# Patient Record
Sex: MALE | Race: WHITE | Employment: UNEMPLOYED | RURAL
[De-identification: names, ages, dates, MRNs, and addresses within clinical notes are randomized per-mention and may not be internally consistent; named-entity substitution may affect disease eponyms.]

---

## 2020-09-09 ENCOUNTER — HOSPITAL ENCOUNTER (EMERGENCY)
Facility: HOSPITAL | Age: 55
Discharge: ANOTHER HOSPITAL | End: 2020-09-10
Attending: EMERGENCY MEDICINE
Payer: COMMERCIAL

## 2020-09-09 ENCOUNTER — APPOINTMENT (OUTPATIENT)
Dept: CT IMAGING | Facility: HOSPITAL | Age: 55
End: 2020-09-09
Payer: COMMERCIAL

## 2020-09-09 LAB
A/G RATIO: 1.3 (ref 0.8–2)
ALBUMIN SERPL-MCNC: 3.6 G/DL (ref 3.4–4.8)
ALP BLD-CCNC: 376 U/L (ref 25–100)
ALT SERPL-CCNC: 3128 U/L (ref 4–36)
AMMONIA: 79 MCG/DL (ref 27–102)
AMORPHOUS: ABNORMAL /HPF
AMPHETAMINE SCREEN, URINE: NORMAL
AMYLASE: 91 U/L (ref 20–104)
ANION GAP SERPL CALCULATED.3IONS-SCNC: 11 MMOL/L (ref 3–16)
APTT: 30.5 SEC (ref 21.6–33.4)
AST SERPL-CCNC: 2714 U/L (ref 8–33)
BARBITURATE SCREEN URINE: NORMAL
BASOPHILS ABSOLUTE: 0.1 K/UL (ref 0–0.1)
BASOPHILS RELATIVE PERCENT: 1.1 %
BENZODIAZEPINE SCREEN, URINE: NORMAL
BILIRUB SERPL-MCNC: 17.8 MG/DL (ref 0.3–1.2)
BILIRUBIN URINE: ABNORMAL
BLOOD, URINE: NEGATIVE
BUN BLDV-MCNC: 15 MG/DL (ref 6–20)
CALCIUM SERPL-MCNC: 9.1 MG/DL (ref 8.5–10.5)
CANNABINOID SCREEN URINE: NORMAL
CHLORIDE BLD-SCNC: 98 MMOL/L (ref 98–107)
CLARITY: CLEAR
CO2: 30 MMOL/L (ref 20–30)
COCAINE METABOLITE SCREEN URINE: NORMAL
COLOR: ABNORMAL
CREAT SERPL-MCNC: 1.2 MG/DL (ref 0.4–1.2)
CRYSTALS, UA: ABNORMAL /HPF
EOSINOPHILS ABSOLUTE: 0.2 K/UL (ref 0–0.4)
EOSINOPHILS RELATIVE PERCENT: 4.4 %
EPITHELIAL CELLS, UA: ABNORMAL /HPF (ref 0–5)
FINE CASTS, UA: ABNORMAL /LPF (ref 0–2)
GFR AFRICAN AMERICAN: >59
GFR NON-AFRICAN AMERICAN: >59
GLOBULIN: 2.8 G/DL
GLUCOSE BLD-MCNC: 92 MG/DL (ref 74–106)
GLUCOSE URINE: NEGATIVE MG/DL
HCT VFR BLD CALC: 43.7 % (ref 40–54)
HEMOGLOBIN: 14.5 G/DL (ref 13–18)
IMMATURE GRANULOCYTES #: 0 K/UL
IMMATURE GRANULOCYTES %: 0.2 % (ref 0–5)
INR BLD: 1.07 (ref 0.87–1.14)
KETONES, URINE: ABNORMAL MG/DL
LACTIC ACID: 1.8 MMOL/L (ref 0.4–2)
LEUKOCYTE ESTERASE, URINE: NEGATIVE
LIPASE: 91 U/L (ref 5.6–51.3)
LYMPHOCYTES ABSOLUTE: 1.5 K/UL (ref 1.5–4)
LYMPHOCYTES RELATIVE PERCENT: 26.7 %
Lab: NORMAL
MCH RBC QN AUTO: 28.9 PG (ref 27–32)
MCHC RBC AUTO-ENTMCNC: 33.2 G/DL (ref 31–35)
MCV RBC AUTO: 87.1 FL (ref 80–100)
METHADONE SCREEN, URINE: NORMAL
METHAMPHETAMINE, URINE: NORMAL
MICROSCOPIC EXAMINATION: YES
MONOCYTES ABSOLUTE: 0.8 K/UL (ref 0.2–0.8)
MONOCYTES RELATIVE PERCENT: 14.4 %
MUCUS: ABNORMAL /LPF
NEUTROPHILS ABSOLUTE: 2.9 K/UL (ref 2–7.5)
NEUTROPHILS RELATIVE PERCENT: 53.2 %
NITRITE, URINE: NEGATIVE
OPIATE SCREEN URINE: NORMAL
PDW BLD-RTO: 17.2 % (ref 11–16)
PH UA: 7 (ref 5–8)
PHENCYCLIDINE SCREEN URINE: NORMAL
PLATELET # BLD: 264 K/UL (ref 150–400)
PMV BLD AUTO: 10.6 FL (ref 6–10)
POTASSIUM SERPL-SCNC: 4 MMOL/L (ref 3.4–5.1)
PROPOXYPHENE SCREEN, URINE: NORMAL
PROTEIN UA: ABNORMAL MG/DL
PROTHROMBIN TIME: 13.8 SEC (ref 11.7–14.6)
RBC # BLD: 5.02 M/UL (ref 4.5–6)
RBC UA: ABNORMAL /HPF (ref 0–4)
SODIUM BLD-SCNC: 139 MMOL/L (ref 136–145)
SPECIFIC GRAVITY UA: 1.02 (ref 1–1.03)
TOTAL PROTEIN: 6.4 G/DL (ref 6.4–8.3)
TRICYCLIC, URINE: NORMAL
UR OXYCODONE RAPID SCREEN: NORMAL
URINE REFLEX TO CULTURE: ABNORMAL
URINE TYPE: ABNORMAL
UROBILINOGEN, URINE: >=8 E.U./DL
WBC # BLD: 5.5 K/UL (ref 4–11)
WBC UA: ABNORMAL /HPF (ref 0–5)

## 2020-09-09 PROCEDURE — 85730 THROMBOPLASTIN TIME PARTIAL: CPT

## 2020-09-09 PROCEDURE — 81001 URINALYSIS AUTO W/SCOPE: CPT

## 2020-09-09 PROCEDURE — 83605 ASSAY OF LACTIC ACID: CPT

## 2020-09-09 PROCEDURE — 6360000002 HC RX W HCPCS

## 2020-09-09 PROCEDURE — 82140 ASSAY OF AMMONIA: CPT

## 2020-09-09 PROCEDURE — 87040 BLOOD CULTURE FOR BACTERIA: CPT

## 2020-09-09 PROCEDURE — 96375 TX/PRO/DX INJ NEW DRUG ADDON: CPT

## 2020-09-09 PROCEDURE — 82248 BILIRUBIN DIRECT: CPT

## 2020-09-09 PROCEDURE — 96366 THER/PROPH/DIAG IV INF ADDON: CPT

## 2020-09-09 PROCEDURE — 2580000003 HC RX 258: Performed by: EMERGENCY MEDICINE

## 2020-09-09 PROCEDURE — 85610 PROTHROMBIN TIME: CPT

## 2020-09-09 PROCEDURE — 82150 ASSAY OF AMYLASE: CPT

## 2020-09-09 PROCEDURE — 85025 COMPLETE CBC W/AUTO DIFF WBC: CPT

## 2020-09-09 PROCEDURE — 99285 EMERGENCY DEPT VISIT HI MDM: CPT

## 2020-09-09 PROCEDURE — 96361 HYDRATE IV INFUSION ADD-ON: CPT

## 2020-09-09 PROCEDURE — 80053 COMPREHEN METABOLIC PANEL: CPT

## 2020-09-09 PROCEDURE — 36415 COLL VENOUS BLD VENIPUNCTURE: CPT

## 2020-09-09 PROCEDURE — 83690 ASSAY OF LIPASE: CPT

## 2020-09-09 PROCEDURE — 96365 THER/PROPH/DIAG IV INF INIT: CPT

## 2020-09-09 PROCEDURE — 99283 EMERGENCY DEPT VISIT LOW MDM: CPT

## 2020-09-09 PROCEDURE — C9113 INJ PANTOPRAZOLE SODIUM, VIA: HCPCS

## 2020-09-09 PROCEDURE — 6360000002 HC RX W HCPCS: Performed by: EMERGENCY MEDICINE

## 2020-09-09 PROCEDURE — 80307 DRUG TEST PRSMV CHEM ANLYZR: CPT

## 2020-09-09 PROCEDURE — 86757 RICKETTSIA ANTIBODY: CPT

## 2020-09-09 PROCEDURE — 74176 CT ABD & PELVIS W/O CONTRAST: CPT

## 2020-09-09 RX ORDER — 0.9 % SODIUM CHLORIDE 0.9 %
1000 INTRAVENOUS SOLUTION INTRAVENOUS ONCE
Status: COMPLETED | OUTPATIENT
Start: 2020-09-09 | End: 2020-09-10

## 2020-09-09 RX ORDER — ONDANSETRON 2 MG/ML
4 INJECTION INTRAMUSCULAR; INTRAVENOUS ONCE
Status: COMPLETED | OUTPATIENT
Start: 2020-09-09 | End: 2020-09-09

## 2020-09-09 RX ORDER — ONDANSETRON 2 MG/ML
4 INJECTION INTRAMUSCULAR; INTRAVENOUS ONCE
Status: DISCONTINUED | OUTPATIENT
Start: 2020-09-09 | End: 2020-09-10 | Stop reason: HOSPADM

## 2020-09-09 RX ORDER — PANTOPRAZOLE SODIUM 40 MG/10ML
INJECTION, POWDER, LYOPHILIZED, FOR SOLUTION INTRAVENOUS
Status: COMPLETED
Start: 2020-09-09 | End: 2020-09-09

## 2020-09-09 RX ADMIN — SODIUM CHLORIDE 1000 ML: 9 INJECTION, SOLUTION INTRAVENOUS at 23:17

## 2020-09-09 RX ADMIN — ONDANSETRON 4 MG: 2 INJECTION INTRAMUSCULAR; INTRAVENOUS at 22:01

## 2020-09-09 RX ADMIN — PANTOPRAZOLE SODIUM 40 MG: 40 INJECTION, POWDER, FOR SOLUTION INTRAVENOUS at 23:18

## 2020-09-09 ASSESSMENT — PATIENT HEALTH QUESTIONNAIRE - PHQ9: SUM OF ALL RESPONSES TO PHQ QUESTIONS 1-9: 6

## 2020-09-09 ASSESSMENT — PAIN SCALES - GENERAL: PAINLEVEL_OUTOF10: 9

## 2020-09-10 VITALS
SYSTOLIC BLOOD PRESSURE: 102 MMHG | OXYGEN SATURATION: 98 % | RESPIRATION RATE: 18 BRPM | HEIGHT: 70 IN | BODY MASS INDEX: 22.19 KG/M2 | HEART RATE: 59 BPM | DIASTOLIC BLOOD PRESSURE: 67 MMHG | TEMPERATURE: 97.7 F | WEIGHT: 155 LBS

## 2020-09-10 LAB
BILIRUBIN DIRECT: >10 MG/DL (ref 0–0.2)
OCCULT BLOOD DIAGNOSTIC: NORMAL

## 2020-09-10 PROCEDURE — 87341 HEP B SURFACE AG NEUTRLZJ IA: CPT

## 2020-09-10 PROCEDURE — C9113 INJ PANTOPRAZOLE SODIUM, VIA: HCPCS | Performed by: EMERGENCY MEDICINE

## 2020-09-10 PROCEDURE — 80074 ACUTE HEPATITIS PANEL: CPT

## 2020-09-10 PROCEDURE — 6360000002 HC RX W HCPCS: Performed by: EMERGENCY MEDICINE

## 2020-09-10 PROCEDURE — 93005 ELECTROCARDIOGRAM TRACING: CPT

## 2020-09-10 PROCEDURE — 36415 COLL VENOUS BLD VENIPUNCTURE: CPT

## 2020-09-10 PROCEDURE — 87040 BLOOD CULTURE FOR BACTERIA: CPT

## 2020-09-10 PROCEDURE — 2580000003 HC RX 258: Performed by: EMERGENCY MEDICINE

## 2020-09-10 PROCEDURE — G0328 FECAL BLOOD SCRN IMMUNOASSAY: HCPCS

## 2020-09-10 RX ORDER — PANTOPRAZOLE SODIUM 40 MG/10ML
INJECTION, POWDER, LYOPHILIZED, FOR SOLUTION INTRAVENOUS
Status: DISPENSED
Start: 2020-09-10 | End: 2020-09-10

## 2020-09-10 RX ORDER — LORAZEPAM 2 MG/ML
1 INJECTION INTRAMUSCULAR ONCE
Status: COMPLETED | OUTPATIENT
Start: 2020-09-10 | End: 2020-09-10

## 2020-09-10 RX ORDER — 0.9 % SODIUM CHLORIDE 0.9 %
1000 INTRAVENOUS SOLUTION INTRAVENOUS ONCE
Status: COMPLETED | OUTPATIENT
Start: 2020-09-10 | End: 2020-09-10

## 2020-09-10 RX ADMIN — SODIUM CHLORIDE 8 MG/HR: 9 INJECTION, SOLUTION INTRAVENOUS at 00:59

## 2020-09-10 RX ADMIN — LORAZEPAM 1 MG: 2 INJECTION INTRAMUSCULAR; INTRAVENOUS at 02:22

## 2020-09-10 RX ADMIN — SODIUM CHLORIDE 8 MG/HR: 9 INJECTION, SOLUTION INTRAVENOUS at 08:40

## 2020-09-10 RX ADMIN — SODIUM CHLORIDE 1000 ML: 9 INJECTION, SOLUTION INTRAVENOUS at 15:10

## 2020-09-10 NOTE — ED NOTES
OF THE [facility] called. ED requesting to check bed availability at [facility] and [facility]. ED unable to transfer with EMS to THE [facility] per Elizabeth Paula [facility]. [facility] had been called by MERLINE Gomez and they also declined the transfer.      Dorothy Bowman, MATILDA  09/10/20 6857 [facility], MATILDA  09/10/20 1025

## 2020-09-10 NOTE — ED NOTES
Lab called and notified that MD ordered a hepatitis panel to be added on.      Vijay Chaidez RN  09/10/20 9807

## 2020-09-10 NOTE — ED NOTES
Patient en route to [facility]. Patient voicing no needs or concerns at this time.      Mary Jauregui RN  09/10/20 9550

## 2020-09-10 NOTE — ED NOTES
Per Dr. Carlotta Nur request, called [facility] and asked them to contact this patient for acceptance.  Spoke with Latesha Amin, she will send out page and return our call MERCY Ruiz  09/10/20 5057

## 2020-09-10 NOTE — ED NOTES
Access center called with dr Bob leung from [place] [id number] regional    Call transferred to dr Terrie Wilkerson, RN  09/10/20 0824

## 2020-09-10 NOTE — ED TRIAGE NOTES
Pt. 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 Ago and had multiple bites and thinks it was ?deer ticks and turkey mites.

## 2020-09-10 NOTE — ED NOTES
On phone with [facility],will call  and try to get pt. A bed.      aRquel Barboza RN  09/10/20 5978

## 2020-09-10 NOTE — ED NOTES
Attempted to call report to [facility]. Nurse unavailable at this time and will call back asap.      Valerie Tompkins RN  09/10/20 2170

## 2020-09-10 NOTE — ED NOTES
Calls into [facility] and [facility].       Erik Garcia, MATILDA  09/10/20 6208

## 2020-09-10 NOTE — ED PROVIDER NOTES
50-year-old male with a history of IV heroin abuse presents to the emergency department with complaint of yellow-tinged skin, pruritus, nausea, vomiting, right upper quadrant abdominal pain. Also reporting melanotic stool. States this is all been going on the past 4 to 5 days. Denies fever, chest pain or shortness of breath. States he used IV heroin regularly around 25 years ago. 3 months ago he states he used a clean needle once. Also reports that he has been working on remodeling an old camper that has a bunch of needles from previous users. Denies history of hepatitis, liver problems, alcohol abuse. Denies history of abdominal surgeries. Was seen by the previous provider Dr. Argentina Sanz and it was difficult to find local beds. He did discuss with [facility] who did accept the patient. After further discussion with ambulance crews there is no one able to transport the patient at this time. Physical exam  Constitutional no acute distress, heart: Regular rate rhythm, no murmur, respiratory: Clear breath sounds bilaterally, abdomen: Mild tenderness to palpation in the right upper quadrant, no rebound, guarding or rigidity, eyes: Scleral icterus noted, integument: Jaundiced skin    MDM:  50-year-old male presents the emergency department with complaint of right upper quadrant abdominal pain and jaundice skin. Lab work shows findings concerning for acute hepatitis. Total bilirubin 17.8. Had dark stool on exam but negative hemoccult test. Was given protonix by previous provider. After discussing further with ambulance companies and unable to obtain transport for the patient will discuss with more local institutions to see if a bed has opened up. We will also order hepatitis panel as well as hepatic function panel. Discussed with [facility] as well as [facility]. They do not have any beds and are holding in the emergency department.   They are not able to accept the patient. Eventually discussed with Dr. Leyda Glynn from gastroenterology from [facility]. States the patient should be transferred over for further evaluation but will need to be admitted to the hospitalist.  Will discuss with the hospitalist.    Discussed with hospitalist at [facility] who will not accept patient as they do not take care of hepatitis patients there. We are unable to get her hepatitis panel back for the next 24 hours. We discussed with gastroenterology from [facility] Dr. Mateo Nair who is okay with patient coming to the hospital under observation with the hospitalist group. Discussed with Dr. Trina Berman who accepts patient.  Will prepare for transfer,    Diagnosis  Direct hyperbilirubinemia  Jaundice  Nausea and vomiting, non-intractable  Right upper quadrant abdominal pain         Lavetta Mohs, DO  09/10/20 1408

## 2020-09-10 NOTE — ED NOTES
Spoke with  staff who advised that pt. Has been accepted to GI at [facility]. She advised that they will be working on getting him a bed and advised that they may not be able to have a bed tonight.      Soy Rea RN  09/10/20 6975

## 2020-09-10 NOTE — ED NOTES
Dr Ike Reeves states that protonix drip can be discontinued. Patient resting quietly with eyes closed at this time.      Mima Garcia RN  09/10/20 9263

## 2020-09-10 NOTE — ED NOTES
Patient report from Ren Mahmood, [address] [place] [address].      Isidro Herrera RN  09/10/20 8629

## 2020-09-10 NOTE — ED PROVIDER NOTES
[facility]      Pt Name: Tressa Silverio  MRN: [id number]  YOB: 1965  Date of evaluation: 9/9/2020  Provider: Carlene Dunham MD    CHIEF COMPLAINT       Chief Complaint   Patient presents with    Emesis     C/o n/v,denies diarrhea. HISTORY OF PRESENT ILLNESS  (Location/Symptom, Timing/Onset, Context/Setting, Quality, Duration, Modifying Factors, Severity.)   Tressa Silverio is a 48 y.o. male who presents to the emergency department with multiple complaints. Patient states that for approximately 7 days he has had intermittent epigastric and right upper quadrant abdominal pain associated with nausea and vomiting. Patient denies any diarrhea. Patient states that over the last 7 days is gotten progressively more jaundiced associated with pruritus. Patient states that he used to use IV heroin approximately 25 years ago but then had stopped and that the last time that he had used was approximately 3 months ago. Patient denies any abnormal use of Tylenol, Motrin or any nonsteroidals. Patient states that he very seldomly drinks alcohol. Patient states that he has had concerns of increased weakness along with dehydration secondary to decreased oral intake. Patient states that the symptoms were getting progressively worse and he got concerned so EMS was called to bring him to the hospital for further evaluation. Patient denies any chest pain shortness of breath or any focal signs or symptoms. Patient is resting comfortably in the room no acute distress conversing in full sentences      Nursing notes were reviewed.     REVIEW OFSYSTEMS    (2-9 systems for level 4, 10 or more for level 5)   ROS:  General: Increased weakness and fatigue  Cardiovascular:  No chest pain, no palpitations  Respiratory:  No shortness of breath, no cough, no wheezing  Gastrointestinal: Epigastric and right upper quadrant abdominal pain associated with nausea and vomiting and melanotic stools  Musculoskeletal:  No muscle pain, no joint pain  Skin: Jaundice  Neurologic:  No speech problems, no headache, no extremity weakness  Psychiatric:  No anxiety  Genitourinary:  No dysuria, no hematuria    Except as noted above the remainder of the review of systems was reviewed and negative. PAST MEDICAL HISTORY   No past medical history on file. SURGICAL HISTORY       Past Surgical History:   Procedure Laterality Date    ANKLE SURGERY      ELBOW SURGERY           CURRENT MEDICATIONS       Previous Medications    No medications on file       ALLERGIES     Morphine    FAMILY HISTORY     No family history on file.        SOCIAL HISTORY       Social History     Socioeconomic History    Marital status: Single     Spouse name: Not on file    Number of children: Not on file    Years of education: Not on file    Highest education level: Not on file   Occupational History    Not on file   Social Needs    Financial resource strain: Not on file    Food insecurity     Worry: Not on file     Inability: Not on file    Transportation needs     Medical: Not on file     Non-medical: Not on file   Tobacco Use    Smoking status: Not on file   Substance and Sexual Activity    Alcohol use: Not on file    Drug use: Not on file    Sexual activity: Not on file   Lifestyle    Physical activity     Days per week: Not on file     Minutes per session: Not on file    Stress: Not on file   Relationships    Social connections     Talks on phone: Not on file     Gets together: Not on file     Attends Confucianist service: Not on file     Active member of club or organization: Not on file     Attends meetings of clubs or organizations: Not on file     Relationship status: Not on file    Intimate partner violence     Fear of current or ex partner: Not on file     Emotionally abused: Not on file     Physically abused: Not on file     Forced sexual activity: Not on file   Other Topics Concern    Not on file   Social History Narrative    Not on file         PHYSICAL EXAM    (up to 7 for level 4, 8 or more for level 5)     ED Triage Vitals [09/09/20 2120]   BP Temp Temp Source Pulse Resp SpO2 Height Weight   104/76 98.6 °F (37 °C) Oral 72 16 98 % 5' 10\" (1.778 m) 155 lb (70.3 kg)       Physical Exam  General :Patient is awake, alert, oriented, in no acute distress, nontoxic appearing  HEENT: Pupils are equally round and reactive to light. Sclerae is icteric. Mild dry oral mucosa. Uvula is midline  Neck: Neck is supple, full range of motion, trachea midline  Cardiac: Heart regular rate, rhythm, no murmurs, rubs, or gallops  Lungs: Lungs are clear to auscultation, there is no wheezing, rhonchi, or rales. There is no use of accessory muscles. Chest wall: There is no tenderness to palpation over the chest wall or over ribs  Abdomen: Abdomen is soft. Epigastric and right upper quadrant abdominal pain on palpation that re-creates the patient's symptoms. Positive bowel sounds all 4 quadrants  Musculoskeletal: 5 out of 5 strength in all 4 extremities. No focal muscle deficits are appreciated  Neuro: Motor intact, sensory intact, level of consciousness is normal, cerebellar function is normal, reflexes are grossly normal. No evidence of incontinence or loss of bowel or bladder function, no saddle anesthesia noted   Dermatology: Jaundiced skin  Psych: Mentation is grossly normal, cognition is grossly normal. Affect is appropriate.       DIAGNOSTIC RESULTS     EKG: All EKG's are interpreted by the Emergency Department Physician who either signs or Co-signs this chart in the [address] a cardiologist.    The EKG interpreted by me shows normal sinus rhythm rate of 71 per EDMD    RADIOLOGY:   Non-plain film images such as CT, Ultrasound and MRI are read by the radiologist. Plain radiographic images are visualized and preliminarily interpreted by the emergency physician with the below findings:      ? Radiologist's Report Reviewed:  CT ABDOMEN PELVIS WO CONTRAST Additional Contrast? None   Final Result      There is evidence of mild nonspecific periportal edema. Nondistended gallbladder. Nonobstructive right-sided nephrolithiasis. No other acute findings in the abdomen or pelvis.             ED BEDSIDE ULTRASOUND:   Performed by ED Physician - none    LABS:    I have reviewed and interpreted all of the currently available lab results from this visit (ifapplicable):  Results for orders placed or performed during the hospital encounter of 09/09/20   CBC Auto Differential   Result Value Ref Range    WBC 5.5 4.0 - 11.0 K/uL    RBC 5.02 4.50 - 6.00 M/uL    Hemoglobin 14.5 13.0 - 18.0 g/dL    Hematocrit 43.7 40.0 - 54.0 %    MCV 87.1 80.0 - 100.0 fL    MCH 28.9 27.0 - 32.0 pg    MCHC 33.2 31.0 - 35.0 g/dL    RDW 17.2 (H) 11.0 - 16.0 %    Platelets 440 415 - 421 K/uL    MPV 10.6 (H) 6.0 - 10.0 fL    Neutrophils % 53.2 %    Immature Granulocytes % 0.2 0.0 - 5.0 %    Lymphocytes % 26.7 %    Monocytes % 14.4 %    Eosinophils % 4.4 %    Basophils % 1.1 %    Neutrophils Absolute 2.9 2.0 - 7.5 K/uL    Immature Granulocytes # 0.0 K/uL    Lymphocytes Absolute 1.5 1.5 - 4.0 K/uL    Monocytes Absolute 0.8 0.2 - 0.8 K/uL    Eosinophils Absolute 0.2 0.0 - 0.4 K/uL    Basophils Absolute 0.1 0.0 - 0.1 K/uL   Comprehensive Metabolic Panel   Result Value Ref Range    Sodium 139 136 - 145 mmol/L    Potassium 4.0 3.4 - 5.1 mmol/L    Chloride 98 98 - 107 mmol/L    CO2 30 20 - 30 mmol/L    Anion Gap 11 3 - 16    Glucose 92 74 - 106 mg/dL    BUN 15 6 - 20 mg/dL    CREATININE 1.2 0.4 - 1.2 mg/dL    GFR Non-African American >59 >59    GFR African American >59 >59    Calcium 9.1 8.5 - 10.5 mg/dL    Total Protein 6.4 6.4 - 8.3 g/dL    Alb 3.6 3.4 - 4.8 g/dL    Albumin/Globulin Ratio 1.3 0.8 - 2.0    Total Bilirubin 17.8 (HH) 0.3 - 1.2 mg/dL    Alkaline Phosphatase 376 (H) 25 - 100 U/L    ALT 3,128 (H) 4 - 36 U/L    AST 2,714 (H) 8 - 33 U/L Globulin 2.8 g/dL   Urine Reflex to Culture    Specimen: Urine, clean catch   Result Value Ref Range    Color, UA Patience Straw/Yellow    Clarity, UA Clear Clear    Glucose, Ur Negative Negative mg/dL    Bilirubin Urine LARGE (A) Negative    Ketones, Urine TRACE (A) Negative mg/dL    Specific Gravity, UA 1.020 1.005 - 1.030    Blood, Urine Negative Negative    pH, UA 7.0 5.0 - 8.0    Protein, UA TRACE (A) Negative mg/dL    Urobilinogen, Urine >=8.0 (A) <2.0 E.U./dL    Nitrite, Urine Negative Negative    Leukocyte Esterase, Urine Negative Negative    Microscopic Examination YES     Urine Type Voided     Urine Reflex to Culture Not Indicated    Drug Screen, Multiple, Urine   Result Value Ref Range    PCP Screen, Urine Neg Negative <25 ng/mL    Benzodiazepine Screen, Urine Neg Negative <300 ng/mL    Cocaine Metabolite Screen, Urine Neg Negative <300 ng/mL    Amphetamine Screen, Urine Neg Negative <1000 ng/mL    Cannabinoid Scrn, Ur Neg Negative <50 ng/mL    Opiate Scrn, Ur Neg Negative <300 ng/mL    Barbiturate Screen, Ur Neg Negative <680 ng/mL    Tricyclic Neg Negative <587 ng/mL    Methadone Screen, Urine Neg Negative <300 ng/ml    Propoxyphene Screen, Urine Neg Negative <300 ng/mL    Methamphetamine, Urine Neg Negative <1000 ng/mL    UR Oxycodone Rapid Screen Neg Negative <100 ng/mL    Drug Screen Comment: see below    Amylase   Result Value Ref Range    Amylase 91 20 - 104 U/L   Lipase   Result Value Ref Range    Lipase 91.0 (H) 5.6 - 51.3 U/L   Protime-INR   Result Value Ref Range    Protime 13.8 11.7 - 14.6 sec    INR 1.07 0.87 - 1.14   APTT   Result Value Ref Range    aPTT 30.5 21.6 - 33.4 sec   Lactic Acid, Plasma   Result Value Ref Range    Lactic Acid 1.8 0.4 - 2.0 mmol/L   Ammonia   Result Value Ref Range    Ammonia 79 27 - 102 mcg/dL   Microscopic Urinalysis   Result Value Ref Range    Fine Casts, UA 0-2 0 - 2 /LPF    Mucus, UA 1+ (A) None Seen /LPF    WBC, UA 0-2 0 - 5 /HPF    RBC, UA None seen 0 - 4 /HPF Epithelial Cells, UA 0-1 0 - 5 /HPF    Amorphous, UA 1+ /HPF    Crystals, UA Few Ca. Oxalate (A) None Seen /HPF   Blood Occult Stool Diagnostic   Result Value Ref Range    Occult Blood Diagnostic Result: Negative  Normal range: Negative           All other labs were within normal range or not returned as of this dictation. EMERGENCY DEPARTMENT COURSE and DIFFERENTIAL DIAGNOSIS/MDM:   Vitals:    Vitals:    09/09/20 2120   BP: 104/76   Pulse: 72   Resp: 16   Temp: 98.6 °F (37 °C)   TempSrc: Oral   SpO2: 98%   Weight: 155 lb (70.3 kg)   Height: 5' 10\" (1.778 m)       MEDICATIONS ADMINISTERED IN ED:  Medications   pantoprazole (PROTONIX) 40 mg in sodium chloride 0.9 % 50 mL bolus (40 mg Intravenous Not Given 9/10/20 0015)   ondansetron (ZOFRAN) injection 4 mg (4 mg Intravenous Not Given 9/10/20 0016)   pantoprazole (PROTONIX) 80 mg in sodium chloride 0.9 % 100 mL infusion (8 mg/hr Intravenous New Bag 9/10/20 0059)   pantoprazole (PROTONIX) 40 MG injection (has no administration in time range)   ondansetron (ZOFRAN) injection 4 mg (4 mg Intravenous Given 9/9/20 2201)   0.9 % sodium chloride bolus (1,000 mLs Intravenous New Bag 9/9/20 2317)   pantoprazole (PROTONIX) 40 MG injection (40 mg  Given 9/9/20 2318)   LORazepam (ATIVAN) injection 1 mg (1 mg Intravenous Given 9/10/20 0222)       Patient was placed examination room at which time after exam was performed IV was obtained and labs are drawn. Patient was given 1 L IV normal saline bolus along with 80 mg of Protonix IV then placed on Protonix drip of 8 mg IV an hour and 4 mg of Zofran IV. Hemoccult was obtained which was melanotic but guaiac negative. Review of patient's labs, BMP was within normal limits. Alk phos was 376, ALT was 3128. Ammonia was 79. AST was 2714. Total bilirubin was 19.8 and patient's lipase was 91. Patient's UDS was negative. CBC was within normal limits. Urinalysis was within normal limits.   CT scan abdomen pelvis without contrast revealed evidence of mild nonspecific periportal edema. Nondistended gallbladder. Right renal stone but no other acute findings. Results were reviewed with patient and the need to be admitted. Patient requested [facility] which was contacted who stated they were on divert and could not accept the patient. Transfer line was contacted who instructed that both [address] and [address] had no beds. [facility]'s house officer was contacted who also stated they had no beds but shortly after Dr. Viktoria Sheriff at [address] was able to be read secondary to a telemetry bed opening up at [address].  Dr. Viktoria Sheriff stated that patient would need a higher level of care secondary to GI and hepatology. Transfer line was contacted and case was discussed at the [facility] with the hospitalist on call Dr. Celestine Gibson who graciously stated that he would accept the patient but wanted to discuss it with GI who he thought would accept the patient. Dr. Celestine Gibson discussed it with Dr. Kevin Garcia with GI at the [facility] who graciously accepted the patient in transfer for further evaluation definitive care. Patient was appreciative the care agrees with the plan above             CONSULTS:  None    PROCEDURES:  Procedures    CRITICAL CARE TIME    Total Critical Care time was 0 minutes, excluding separately reportable procedures. There was a high probability of clinically significant/life threatening deterioration in the patient's condition which required my urgent intervention. FINAL IMPRESSION      1. Non-intractable vomiting with nausea, unspecified vomiting type Stable   2. Jaundice, non- Stable   3. Viral hepatitis without hepatic coma, unspecified chronicity, unspecified viral hepatitis type Stable   4. Epigastric pain Stable   5. General weakness Stable   6.  Melanotic stools Stable         DISPOSITION/PLAN   DISPOSITION  09/10/2020 04:28:07 AM transfer to the [facility]      PATIENT REFERRED TO:  No follow-up provider specified. DISCHARGE MEDICATIONS:  New Prescriptions    No medications on file       Comment: Please note this report has been produced using speech recognition software and may contain errorsrelated to that system including errors in grammar, punctuation, and spelling, as well as words and phrases that may be inappropriate. If there are any questions or concerns please feel free to contact the dictating providerfor clarification.     Chris Khan MD  Attending Emergency Physician              Chris Khan MD  09/10/20 9306

## 2020-09-11 LAB
HAV IGM SER IA-ACNC: ABNORMAL
HEPATITIS B CORE IGM ANTIBODY: REACTIVE
HEPATITIS B SURFACE ANTIGEN INTERPRETATION: REACTIVE
HEPATITIS C ANTIBODY INTERPRETATION: ABNORMAL

## 2020-09-14 LAB
BLOOD CULTURE, ROUTINE: NORMAL
CULTURE, BLOOD 2: NORMAL

## 2020-09-17 LAB
ROCKY MOUNTAIN SPOTTED FEVER AB IGM,: NORMAL
ROCKY MOUNTAIN SPOTTED FEVER ANTIBODY IGG: NORMAL

## 2023-08-07 ENCOUNTER — APPOINTMENT (OUTPATIENT)
Dept: GENERAL RADIOLOGY | Facility: HOSPITAL | Age: 58
End: 2023-08-07
Payer: COMMERCIAL

## 2023-08-07 ENCOUNTER — HOSPITAL ENCOUNTER (EMERGENCY)
Facility: HOSPITAL | Age: 58
Discharge: HOME | End: 2023-08-07
Attending: EMERGENCY MEDICINE | Admitting: EMERGENCY MEDICINE
Payer: COMMERCIAL

## 2023-08-07 VITALS
BODY MASS INDEX: 22.22 KG/M2 | OXYGEN SATURATION: 98 % | HEART RATE: 102 BPM | HEIGHT: 69 IN | SYSTOLIC BLOOD PRESSURE: 139 MMHG | WEIGHT: 150 LBS | RESPIRATION RATE: 17 BRPM | DIASTOLIC BLOOD PRESSURE: 110 MMHG | TEMPERATURE: 98.7 F

## 2023-08-07 DIAGNOSIS — M25.552 LEFT HIP PAIN: Primary | ICD-10-CM

## 2023-08-07 PROCEDURE — 63710000001 DEXAMETHASONE PER 0.25 MG: Performed by: PHYSICIAN ASSISTANT

## 2023-08-07 PROCEDURE — 96372 THER/PROPH/DIAG INJ SC/IM: CPT

## 2023-08-07 PROCEDURE — 72100 X-RAY EXAM L-S SPINE 2/3 VWS: CPT

## 2023-08-07 PROCEDURE — 25010000002 KETOROLAC TROMETHAMINE PER 15 MG: Performed by: PHYSICIAN ASSISTANT

## 2023-08-07 PROCEDURE — 73502 X-RAY EXAM HIP UNI 2-3 VIEWS: CPT

## 2023-08-07 PROCEDURE — 99283 EMERGENCY DEPT VISIT LOW MDM: CPT

## 2023-08-07 RX ORDER — KETOROLAC TROMETHAMINE 30 MG/ML
60 INJECTION, SOLUTION INTRAMUSCULAR; INTRAVENOUS ONCE
Status: COMPLETED | OUTPATIENT
Start: 2023-08-07 | End: 2023-08-07

## 2023-08-07 RX ORDER — CYCLOBENZAPRINE HCL 5 MG
5 TABLET ORAL 3 TIMES DAILY PRN
Qty: 8 TABLET | Refills: 0 | Status: SHIPPED | OUTPATIENT
Start: 2023-08-07

## 2023-08-07 RX ORDER — CYCLOBENZAPRINE HCL 10 MG
10 TABLET ORAL ONCE
Status: COMPLETED | OUTPATIENT
Start: 2023-08-07 | End: 2023-08-07

## 2023-08-07 RX ORDER — PREDNISONE 50 MG/1
50 TABLET ORAL DAILY
Qty: 5 TABLET | Refills: 0 | Status: SHIPPED | OUTPATIENT
Start: 2023-08-08 | End: 2023-08-13

## 2023-08-07 RX ORDER — LIDOCAINE 50 MG/G
1 PATCH TOPICAL
Status: DISCONTINUED | OUTPATIENT
Start: 2023-08-07 | End: 2023-08-07 | Stop reason: HOSPADM

## 2023-08-07 RX ORDER — LIDOCAINE 50 MG/G
1 PATCH TOPICAL EVERY 24 HOURS
Qty: 6 PATCH | Refills: 0 | Status: SHIPPED | OUTPATIENT
Start: 2023-08-07 | End: 2023-08-13

## 2023-08-07 RX ADMIN — CYCLOBENZAPRINE 10 MG: 10 TABLET, FILM COATED ORAL at 17:09

## 2023-08-07 RX ADMIN — LIDOCAINE 1 PATCH: 50 PATCH TOPICAL at 17:11

## 2023-08-07 RX ADMIN — DEXAMETHASONE 10 MG: 4 TABLET ORAL at 17:09

## 2023-08-07 RX ADMIN — KETOROLAC TROMETHAMINE 60 MG: 30 INJECTION, SOLUTION INTRAMUSCULAR at 17:11
